# Patient Record
Sex: MALE | Race: WHITE
[De-identification: names, ages, dates, MRNs, and addresses within clinical notes are randomized per-mention and may not be internally consistent; named-entity substitution may affect disease eponyms.]

---

## 2019-07-12 ENCOUNTER — HOSPITAL ENCOUNTER (EMERGENCY)
Dept: HOSPITAL 8 - ED | Age: 24
Discharge: HOME | End: 2019-07-12
Payer: COMMERCIAL

## 2019-07-12 VITALS — HEIGHT: 67 IN | BODY MASS INDEX: 22.94 KG/M2 | WEIGHT: 146.17 LBS

## 2019-07-12 VITALS — DIASTOLIC BLOOD PRESSURE: 81 MMHG | SYSTOLIC BLOOD PRESSURE: 119 MMHG

## 2019-07-12 DIAGNOSIS — M54.9: ICD-10-CM

## 2019-07-12 DIAGNOSIS — S60.511A: ICD-10-CM

## 2019-07-12 DIAGNOSIS — S60.512A: Primary | ICD-10-CM
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
## 2019-07-12 DIAGNOSIS — G89.11: ICD-10-CM

## 2019-07-12 PROCEDURE — 90471 IMMUNIZATION ADMIN: CPT

## 2019-07-12 PROCEDURE — 99283 EMERGENCY DEPT VISIT LOW MDM: CPT

## 2019-07-12 PROCEDURE — 90715 TDAP VACCINE 7 YRS/> IM: CPT

## 2022-07-30 ENCOUNTER — OFFICE VISIT (OUTPATIENT)
Dept: URGENT CARE | Facility: CLINIC | Age: 27
End: 2022-07-30
Payer: COMMERCIAL

## 2022-07-30 ENCOUNTER — HOSPITAL ENCOUNTER (OUTPATIENT)
Dept: RADIOLOGY | Facility: MEDICAL CENTER | Age: 27
End: 2022-07-30
Attending: NURSE PRACTITIONER
Payer: COMMERCIAL

## 2022-07-30 ENCOUNTER — HOSPITAL ENCOUNTER (OUTPATIENT)
Facility: MEDICAL CENTER | Age: 27
End: 2022-07-30
Attending: NURSE PRACTITIONER
Payer: COMMERCIAL

## 2022-07-30 ENCOUNTER — TELEPHONE (OUTPATIENT)
Dept: URGENT CARE | Facility: CLINIC | Age: 27
End: 2022-07-30

## 2022-07-30 VITALS
HEIGHT: 67 IN | HEART RATE: 92 BPM | DIASTOLIC BLOOD PRESSURE: 72 MMHG | OXYGEN SATURATION: 98 % | BODY MASS INDEX: 26.3 KG/M2 | WEIGHT: 167.55 LBS | TEMPERATURE: 98.9 F | SYSTOLIC BLOOD PRESSURE: 112 MMHG | RESPIRATION RATE: 14 BRPM

## 2022-07-30 DIAGNOSIS — N50.811 PAIN IN RIGHT TESTICLE: ICD-10-CM

## 2022-07-30 DIAGNOSIS — R51.9 BILATERAL HEADACHE: ICD-10-CM

## 2022-07-30 DIAGNOSIS — J06.9 UPPER RESPIRATORY TRACT INFECTION, UNSPECIFIED TYPE: ICD-10-CM

## 2022-07-30 LAB
APPEARANCE UR: CLEAR
BILIRUB UR STRIP-MCNC: NEGATIVE MG/DL
COLOR UR AUTO: YELLOW
GLUCOSE UR STRIP.AUTO-MCNC: NEGATIVE MG/DL
KETONES UR STRIP.AUTO-MCNC: NEGATIVE MG/DL
LEUKOCYTE ESTERASE UR QL STRIP.AUTO: NEGATIVE
NITRITE UR QL STRIP.AUTO: NEGATIVE
PH UR STRIP.AUTO: 6 [PH] (ref 5–8)
PROT UR QL STRIP: NEGATIVE MG/DL
RBC UR QL AUTO: NEGATIVE
SP GR UR STRIP.AUTO: 1.02
UROBILINOGEN UR STRIP-MCNC: 0.2 MG/DL

## 2022-07-30 PROCEDURE — 87591 N.GONORRHOEAE DNA AMP PROB: CPT

## 2022-07-30 PROCEDURE — 81002 URINALYSIS NONAUTO W/O SCOPE: CPT | Performed by: NURSE PRACTITIONER

## 2022-07-30 PROCEDURE — 99204 OFFICE O/P NEW MOD 45 MIN: CPT | Performed by: NURSE PRACTITIONER

## 2022-07-30 PROCEDURE — 76870 US EXAM SCROTUM: CPT

## 2022-07-30 PROCEDURE — 87491 CHLMYD TRACH DNA AMP PROBE: CPT

## 2022-07-30 RX ORDER — METHYLPHENIDATE HYDROCHLORIDE 10 MG/1
TABLET ORAL
COMMUNITY
Start: 2022-06-23

## 2022-07-30 RX ORDER — KETOROLAC TROMETHAMINE 30 MG/ML
30 INJECTION, SOLUTION INTRAMUSCULAR; INTRAVENOUS ONCE
Status: COMPLETED | OUTPATIENT
Start: 2022-07-30 | End: 2022-07-30

## 2022-07-30 RX ORDER — IBUPROFEN 600 MG/1
600 TABLET ORAL EVERY 6 HOURS PRN
Qty: 30 TABLET | Refills: 0 | Status: SHIPPED | OUTPATIENT
Start: 2022-07-30

## 2022-07-30 RX ADMIN — KETOROLAC TROMETHAMINE 30 MG: 30 INJECTION, SOLUTION INTRAMUSCULAR; INTRAVENOUS at 10:24

## 2022-07-30 ASSESSMENT — ENCOUNTER SYMPTOMS
AFFECTED TESTICLE: 1
HEADACHES: 1
DOUBLE VISION: 0
SORE THROAT: 0
NAUSEA: 0
COUGH: 0
FEVER: 0
BLURRED VISION: 1
DIZZINESS: 1
VOMITING: 0

## 2022-07-30 NOTE — PATIENT INSTRUCTIONS
-Oral hydration  -NSAID's as directed for pain. Can also use OTC tylenol.    Follow up with PCP. Follow up emergently for neuro changes, vision changes, fever, persistent or increased neck stiffness, uncontrolled vomiting, syncope or near syncope, changes in speech, confusion, weakness, facial droop, on increased testicular pain or swelling.

## 2022-07-30 NOTE — PROGRESS NOTES
Subjective:     Herber Weinberg is a 27 y.o. male who presents for Migraine (Pt states began Monday, fatigue) and Testicle Pain (Pt states began yesterday, R side pain, soreness )      URI symptoms since Sunday. Had negative COVID testing x 3. No hx of migraines. Dizzy with standing, brief vision blurriness. No current visual changes. Taking Ibuprofen and cold medications. Headache is bilateral, frontal stating it is radiating towards the back now. Pain 3/10, Last ibuprofen was yesterday. Cold symptoms have improved.     Has also had right testicular pain x 1 day. Similar pain last month x 4 days. No injury. Denies risk for STI. Is sexually active. No dysuria. No discharge. No mass. Mild irritation. Radiates to right groin. Had STI testing 4-5 weeks ago.     Headache  Testicle Pain  Associated symptoms include headaches. Pertinent negatives include no coughing, fever, nausea, rash, sore throat or vomiting.       History reviewed. No pertinent past medical history.    History reviewed. No pertinent surgical history.    Social History     Socioeconomic History   • Marital status: Single     Spouse name: Not on file   • Number of children: Not on file   • Years of education: Not on file   • Highest education level: Not on file   Occupational History   • Not on file   Tobacco Use   • Smoking status: Not on file   • Smokeless tobacco: Not on file   Substance and Sexual Activity   • Alcohol use: Not on file   • Drug use: Not on file   • Sexual activity: Not on file   Other Topics Concern   • Not on file   Social History Narrative   • Not on file     Social Determinants of Health     Financial Resource Strain: Not on file   Food Insecurity: Not on file   Transportation Needs: Not on file   Physical Activity: Not on file   Stress: Not on file   Social Connections: Not on file   Intimate Partner Violence: Not on file   Housing Stability: Not on file        History reviewed. No pertinent family history.     No Known  "Allergies    Review of Systems   Constitutional: Positive for malaise/fatigue. Negative for fever.   HENT: Negative for sore throat.    Eyes: Positive for blurred vision. Negative for double vision.   Respiratory: Negative for cough.    Gastrointestinal: Negative for nausea and vomiting.   Skin: Negative for rash.   Neurological: Positive for dizziness and headaches.   All other systems reviewed and are negative.       Objective:   /72 (BP Location: Right arm, Patient Position: Sitting, BP Cuff Size: Adult)   Pulse 92   Temp 37.2 °C (98.9 °F) (Temporal)   Resp 14   Ht 1.702 m (5' 7\")   Wt 76 kg (167 lb 8.8 oz)   SpO2 98%   BMI 26.24 kg/m²     Physical Exam  Vitals reviewed.   Constitutional:       General: He is not in acute distress.     Appearance: He is well-developed. He is not toxic-appearing.   HENT:      Head: Normocephalic and atraumatic.      Right Ear: External ear normal. Tympanic membrane is not erythematous.      Left Ear: External ear normal. Tympanic membrane is not erythematous.      Nose: Mucosal edema present.      Mouth/Throat:      Lips: Pink.      Mouth: Mucous membranes are moist.      Pharynx: Oropharynx is clear.   Eyes:      Conjunctiva/sclera: Conjunctivae normal.      Pupils: Pupils are equal, round, and reactive to light.   Cardiovascular:      Rate and Rhythm: Normal rate.   Pulmonary:      Effort: Pulmonary effort is normal. No respiratory distress.      Breath sounds: Normal breath sounds.   Abdominal:      General: There is no distension.      Palpations: Abdomen is soft.      Hernia: There is no hernia in the right inguinal area.   Genitourinary:     Pubic Area: No rash.       Penis: No discharge.       Testes: Cremasteric reflex is present.         Right: Tenderness present. Mass or swelling not present. Right testis is descended. Cremasteric reflex is present.          Left: Tenderness not present. Left testis is descended.   Musculoskeletal:         General: Normal " range of motion.      Cervical back: Normal range of motion. No rigidity.   Lymphadenopathy:      Lower Body: No right inguinal adenopathy.   Skin:     General: Skin is warm and dry.      Findings: No rash.   Neurological:      General: No focal deficit present.      Mental Status: He is alert and oriented to person, place, and time.      GCS: GCS eye subscore is 4. GCS verbal subscore is 5. GCS motor subscore is 6.   Psychiatric:         Mood and Affect: Mood normal.         Speech: Speech normal.         Behavior: Behavior normal.         Thought Content: Thought content normal.         Judgment: Judgment normal.         Assessment/Plan:   1. Pain in right testicle  - POCT Urinalysis  - Chlamydia/GC, PCR (Urine); Future  - ibuprofen (MOTRIN) 600 MG Tab; Take 1 Tablet by mouth every 6 hours as needed for Moderate Pain, Headache or Inflammation.  Dispense: 30 Tablet; Refill: 0  - [id number]; Future  - Referral to Urology    2. Bilateral headache  - ketorolac (TORADOL) injection 30 mg  - ibuprofen (MOTRIN) 600 MG Tab; Take 1 Tablet by mouth every 6 hours as needed for Moderate Pain, Headache or Inflammation.  Dispense: 30 Tablet; Refill: 0    3. Upper respiratory tract infection, unspecified type    Results for orders placed or performed in visit on 07/30/22   POCT Urinalysis   Result Value Ref Range    POC Color YELLOW Negative    POC Appearance CLEAR Negative    POC Leukocyte Esterase NEGATIVE Negative    POC Nitrites NEGATIVE Negative    POC Urobiligen 0.2 Negative (0.2) mg/dL    POC Protein NEGATIVE Negative mg/dL    POC Urine PH 6.0 5.0 - 8.0    POC Blood NEGATIVE Negative    POC Specific Gravity 1.020 <1.005 - >1.030    POC Ketones NEGATIVE Negative mg/dL    POC Bilirubin NEGATIVE Negative mg/dL    POC Glucose NEGATIVE Negative mg/dL     -Oral hydration  -NSAID's as directed for pain. Can also use OTC tylenol.    Follow up with PCP. Follow up emergently for neuro changes, vision changes, fever,  persistent or increased neck stiffness, uncontrolled vomiting, syncope or near syncope, changes in speech, confusion, weakness, facial droop, on increased testicular pain or swelling.     Stable vitals. No new neuro deficits. Afebrile. Discussed correlation of HA with recent viral infection, initial tx with NSAIDs. No palpable hernia of mass to correlate to testicular pain. Patient to f/u on U/S.     [id number]    Result Date: 7/30/2022 7/30/2022 11:20 AM HISTORY/REASON FOR EXAM: Right scrotal pain TECHNIQUE/EXAM DESCRIPTION: Real-time sonography of the scrotum was performed with gray-scale, color and duplex Doppler imaging. COMPARISON: None FINDINGS: The testes are homogeneous in echotexture and low-resistive waveforms are confirmed bilaterally. No intratesticular mass is seen. Vascular flow is symmetric. The right testis measures 4.3 x 2.3 x 2.9 cm. The left testis measures 4.6 x 2.1 x 2.6 cm. The epididymides are within normal limits. No abnormal volume hydrocele is detected. No varicocele is detected.     Normal scrotum ultrasound.    Differential diagnosis, natural history, supportive care, and indications for immediate follow-up discussed.

## 2022-07-31 LAB
C TRACH DNA SPEC QL NAA+PROBE: NEGATIVE
N GONORRHOEA DNA SPEC QL NAA+PROBE: NEGATIVE
SPECIMEN SOURCE: NORMAL

## 2022-08-01 ENCOUNTER — TELEPHONE (OUTPATIENT)
Dept: URGENT CARE | Facility: CLINIC | Age: 27
End: 2022-08-01
Payer: COMMERCIAL